# Patient Record
Sex: MALE | Race: WHITE | NOT HISPANIC OR LATINO | Employment: FULL TIME | URBAN - NONMETROPOLITAN AREA
[De-identification: names, ages, dates, MRNs, and addresses within clinical notes are randomized per-mention and may not be internally consistent; named-entity substitution may affect disease eponyms.]

---

## 2022-08-29 ENCOUNTER — HOSPITAL ENCOUNTER (EMERGENCY)
Facility: HOSPITAL | Age: 52
Discharge: HOME | End: 2022-08-29
Attending: FAMILY MEDICINE
Payer: OTHER MISCELLANEOUS

## 2022-08-29 VITALS
TEMPERATURE: 98 F | OXYGEN SATURATION: 96 % | DIASTOLIC BLOOD PRESSURE: 94 MMHG | SYSTOLIC BLOOD PRESSURE: 139 MMHG | HEART RATE: 75 BPM | WEIGHT: 190 LBS | RESPIRATION RATE: 18 BRPM | HEIGHT: 72 IN | BODY MASS INDEX: 25.73 KG/M2

## 2022-08-29 DIAGNOSIS — M79.89 FOREARM SWELLING: ICD-10-CM

## 2022-08-29 DIAGNOSIS — M79.5 FOREIGN BODY (FB) IN SOFT TISSUE: Primary | ICD-10-CM

## 2022-08-29 LAB
ALBUMIN SERPL BCP-MCNC: 4.2 G/DL (ref 3.5–5)
ALBUMIN/GLOB SERPL: 1.2 {RATIO}
ALP SERPL-CCNC: 85 U/L (ref 45–115)
ALT SERPL W P-5'-P-CCNC: 21 U/L (ref 16–61)
ANION GAP SERPL CALCULATED.3IONS-SCNC: 9 MMOL/L (ref 7–16)
AST SERPL W P-5'-P-CCNC: 14 U/L (ref 15–37)
BASOPHILS # BLD AUTO: 0.04 K/UL (ref 0–0.2)
BASOPHILS NFR BLD AUTO: 0.5 % (ref 0–1)
BILIRUB SERPL-MCNC: 1.2 MG/DL (ref ?–1.2)
BUN SERPL-MCNC: 10 MG/DL (ref 7–18)
BUN/CREAT SERPL: 13 (ref 6–20)
CALCIUM SERPL-MCNC: 8.6 MG/DL (ref 8.5–10.1)
CHLORIDE SERPL-SCNC: 103 MMOL/L (ref 98–107)
CO2 SERPL-SCNC: 28 MMOL/L (ref 21–32)
CREAT SERPL-MCNC: 0.78 MG/DL (ref 0.7–1.3)
DIFFERENTIAL METHOD BLD: ABNORMAL
EGFR (NO RACE VARIABLE) (RUSH/TITUS): 107 ML/MIN/1.73M²
EOSINOPHIL # BLD AUTO: 0.18 K/UL (ref 0–0.5)
EOSINOPHIL NFR BLD AUTO: 2.3 % (ref 1–4)
ERYTHROCYTE [DISTWIDTH] IN BLOOD BY AUTOMATED COUNT: 13.4 % (ref 11.5–14.5)
GLOBULIN SER-MCNC: 3.4 G/DL (ref 2–4)
GLUCOSE SERPL-MCNC: 93 MG/DL (ref 74–106)
HCT VFR BLD AUTO: 42.5 % (ref 40–54)
HGB BLD-MCNC: 14.7 G/DL (ref 13.5–18)
LYMPHOCYTES # BLD AUTO: 2.22 K/UL (ref 1–4.8)
LYMPHOCYTES NFR BLD AUTO: 28.3 % (ref 27–41)
MCH RBC QN AUTO: 29.3 PG (ref 27–31)
MCHC RBC AUTO-ENTMCNC: 34.6 G/DL (ref 32–36)
MCV RBC AUTO: 84.8 FL (ref 80–96)
MONOCYTES # BLD AUTO: 0.64 K/UL (ref 0–0.8)
MONOCYTES NFR BLD AUTO: 8.2 % (ref 2–6)
MPC BLD CALC-MCNC: 10.6 FL (ref 9.4–12.4)
NEUTROPHILS # BLD AUTO: 4.76 K/UL (ref 1.8–7.7)
NEUTROPHILS NFR BLD AUTO: 60.7 % (ref 53–65)
PLATELET # BLD AUTO: 211 K/UL (ref 150–400)
POTASSIUM SERPL-SCNC: 3.6 MMOL/L (ref 3.5–5.1)
PROT SERPL-MCNC: 7.6 G/DL (ref 6.4–8.2)
RBC # BLD AUTO: 5.01 M/UL (ref 4.6–6.2)
SODIUM SERPL-SCNC: 136 MMOL/L (ref 136–145)
WBC # BLD AUTO: 7.84 K/UL (ref 4.5–11)

## 2022-08-29 PROCEDURE — 63600175 PHARM REV CODE 636 W HCPCS: Performed by: FAMILY MEDICINE

## 2022-08-29 PROCEDURE — 80053 COMPREHEN METABOLIC PANEL: CPT | Performed by: FAMILY MEDICINE

## 2022-08-29 PROCEDURE — 90471 IMMUNIZATION ADMIN: CPT | Performed by: FAMILY MEDICINE

## 2022-08-29 PROCEDURE — 90715 TDAP VACCINE 7 YRS/> IM: CPT | Performed by: FAMILY MEDICINE

## 2022-08-29 PROCEDURE — 85025 COMPLETE CBC W/AUTO DIFF WBC: CPT | Performed by: FAMILY MEDICINE

## 2022-08-29 PROCEDURE — 25000003 PHARM REV CODE 250: Performed by: FAMILY MEDICINE

## 2022-08-29 PROCEDURE — 36415 COLL VENOUS BLD VENIPUNCTURE: CPT | Performed by: FAMILY MEDICINE

## 2022-08-29 PROCEDURE — 99283 PR EMERGENCY DEPT VISIT,LEVEL III: ICD-10-PCS | Mod: ,,, | Performed by: FAMILY MEDICINE

## 2022-08-29 PROCEDURE — 99284 EMERGENCY DEPT VISIT MOD MDM: CPT

## 2022-08-29 PROCEDURE — 99283 EMERGENCY DEPT VISIT LOW MDM: CPT | Mod: ,,, | Performed by: FAMILY MEDICINE

## 2022-08-29 RX ORDER — SULFAMETHOXAZOLE AND TRIMETHOPRIM 800; 160 MG/1; MG/1
1 TABLET ORAL 2 TIMES DAILY
Qty: 14 TABLET | Refills: 0 | Status: SHIPPED | OUTPATIENT
Start: 2022-08-29 | End: 2022-09-05

## 2022-08-29 RX ORDER — SULFAMETHOXAZOLE AND TRIMETHOPRIM 800; 160 MG/1; MG/1
1 TABLET ORAL 2 TIMES DAILY
Qty: 14 TABLET | Refills: 0 | Status: SHIPPED | OUTPATIENT
Start: 2022-08-29 | End: 2022-08-29 | Stop reason: SDUPTHER

## 2022-08-29 RX ADMIN — TETANUS TOXOID, REDUCED DIPHTHERIA TOXOID AND ACELLULAR PERTUSSIS VACCINE, ADSORBED 0.5 ML: 5; 2.5; 8; 8; 2.5 SUSPENSION INTRAMUSCULAR at 01:08

## 2022-08-29 RX ADMIN — BACITRACIN ZINC, NEOMYCIN, POLYMYXIN B 1 EACH: 400; 3.5; 5 OINTMENT TOPICAL at 01:08

## 2022-08-29 NOTE — ED TRIAGE NOTES
51 year old male presents to ed with c/o left arm injury appx 4 hours pta.  Patient states he was working on chain when it  broke and hit him in the left forearm.  Appx 1cm laceration noted.  Bleeding is controlled.  Swelling noted.

## 2022-08-29 NOTE — ED PROVIDER NOTES
Encounter Date: 8/29/2022       History     Chief Complaint   Patient presents with    Arm Injury     Left      PT HERE WITH CONCERNS OF METAL TO HIS ARM. STATES WAS WORKING TODAY AND PIECE OF METAL FLEW OFF AND HIT HIS ARM. HX OF PIECE OF METAL IN HIS ARM IN THE PAST.     The history is provided by the patient.   Review of patient's allergies indicates:  No Known Allergies  History reviewed. No pertinent past medical history.  History reviewed. No pertinent surgical history.  History reviewed. No pertinent family history.  Social History     Tobacco Use    Smoking status: Never    Smokeless tobacco: Never   Substance Use Topics    Alcohol use: Not Currently    Drug use: Never     Review of Systems   Constitutional: Negative.    HENT: Negative.     Respiratory: Negative.     Cardiovascular: Negative.    Gastrointestinal: Negative.    Genitourinary: Negative.    Musculoskeletal: Negative.    Skin:  Positive for wound.   Neurological: Negative.    Hematological: Negative.    Psychiatric/Behavioral: Negative.       Physical Exam     Initial Vitals [08/29/22 1135]   BP Pulse Resp Temp SpO2   (!) 158/94 72 18 98.1 °F (36.7 °C) 96 %      MAP       --         Physical Exam    Constitutional: He appears well-developed and well-nourished.   HENT:   Head: Normocephalic and atraumatic.   Eyes: EOM are normal. Pupils are equal, round, and reactive to light.   Neck: Neck supple.   Normal range of motion.  Cardiovascular:  Normal rate and regular rhythm.           Pulmonary/Chest: Breath sounds normal.   Abdominal: Abdomen is soft. Bowel sounds are normal.   Musculoskeletal:         General: Normal range of motion.      Cervical back: Normal range of motion and neck supple.     Skin: There is erythema.   Psychiatric: He has a normal mood and affect. His behavior is normal. Judgment and thought content normal.       Medical Screening Exam   See Full Note    ED Course   Procedures  Labs Reviewed   COMPREHENSIVE METABOLIC PANEL -  Abnormal; Notable for the following components:       Result Value    AST 14 (*)     All other components within normal limits   CBC WITH DIFFERENTIAL - Abnormal; Notable for the following components:    Monocytes % 8.2 (*)     All other components within normal limits   CBC W/ AUTO DIFFERENTIAL    Narrative:     The following orders were created for panel order CBC auto differential.  Procedure                               Abnormality         Status                     ---------                               -----------         ------                     CBC with Differential[[id number]]        Abnormal            Final result                 Please view results for these tests on the individual orders.          Imaging Results              X-Ray Forearm Left (Final result)  Result time 08/29/22 12:09:44      Final result by Khris Urban II, MD (08/29/22 12:09:44)                   Impression:      Soft tissue swelling and foreign bodies as described above.      Electronically signed by: Khris Urban  Date:    08/29/2022  Time:    12:09               Narrative:    EXAMINATION:  XR FOREARM LEFT    CLINICAL HISTORY:  Other specified soft tissue disorders    COMPARISON:  None available    FINDINGS:  No evidence of fracture seen.  The alignment of the joints appears normal.  No degenerative change is present.  Metallic foreign body seen in the soft tissues.  There is soft tissue swelling present dorsal to the distal ulna.  No other soft tissue abnormality is seen.                                       Medications - No data to display  Medical Decision Making:   ED Management:  Discussed with Dr. Powell. No need for intervention as long as neurovascular intact with no pulsatile bleeding.                 Clinical Impression:   Final diagnoses:  [M79.89] Forearm swelling  [M79.5] Foreign body (FB) in soft tissue (Primary)        ED Disposition Condition    Discharge Stable          ED Prescriptions       Medication  Sig Dispense Start Date End Date Auth. Provider    sulfamethoxazole-trimethoprim 800-160mg (BACTRIM DS) 800-160 mg Tab Take 1 tablet by mouth 2 (two) times daily. for 7 days 14 tablet 8/29/2022 9/5/2022 Shweta Angulo MD          Follow-up Information    None          Shweta Angulo MD  08/29/22 1314